# Patient Record
Sex: FEMALE | Race: BLACK OR AFRICAN AMERICAN | Employment: UNEMPLOYED | URBAN - METROPOLITAN AREA
[De-identification: names, ages, dates, MRNs, and addresses within clinical notes are randomized per-mention and may not be internally consistent; named-entity substitution may affect disease eponyms.]

---

## 2021-09-07 ENCOUNTER — HOSPITAL ENCOUNTER (EMERGENCY)
Age: 66
Discharge: PSYCHIATRIC OR SUBSTANCE USE FACILITY | End: 2021-09-08
Attending: EMERGENCY MEDICINE
Payer: MEDICARE

## 2021-09-07 DIAGNOSIS — R45.851 SUICIDAL IDEATION: Primary | ICD-10-CM

## 2021-09-07 LAB
ALBUMIN SERPL-MCNC: 3.3 G/DL (ref 3.4–5)
ALBUMIN/GLOB SERPL: 0.9 {RATIO} (ref 0.8–1.7)
ALP SERPL-CCNC: 56 U/L (ref 45–117)
ALT SERPL-CCNC: 24 U/L (ref 13–56)
AMPHET UR QL SCN: NEGATIVE
ANION GAP SERPL CALC-SCNC: 7 MMOL/L (ref 3–18)
AST SERPL-CCNC: 7 U/L (ref 10–38)
BARBITURATES UR QL SCN: NEGATIVE
BASOPHILS # BLD: 0 K/UL (ref 0–0.1)
BASOPHILS NFR BLD: 0 % (ref 0–2)
BENZODIAZ UR QL: NEGATIVE
BILIRUB SERPL-MCNC: 0.2 MG/DL (ref 0.2–1)
BUN SERPL-MCNC: 22 MG/DL (ref 7–18)
BUN/CREAT SERPL: 20 (ref 12–20)
CALCIUM SERPL-MCNC: 9.5 MG/DL (ref 8.5–10.1)
CANNABINOIDS UR QL SCN: NEGATIVE
CHLORIDE SERPL-SCNC: 109 MMOL/L (ref 100–111)
CO2 SERPL-SCNC: 27 MMOL/L (ref 21–32)
COCAINE UR QL SCN: NEGATIVE
COVID-19 RAPID TEST, COVR: NOT DETECTED
CREAT SERPL-MCNC: 1.11 MG/DL (ref 0.6–1.3)
DIFFERENTIAL METHOD BLD: ABNORMAL
EOSINOPHIL # BLD: 0.1 K/UL (ref 0–0.4)
EOSINOPHIL NFR BLD: 1 % (ref 0–5)
ERYTHROCYTE [DISTWIDTH] IN BLOOD BY AUTOMATED COUNT: 14.9 % (ref 11.6–14.5)
ETHANOL SERPL-MCNC: <3 MG/DL (ref 0–3)
GLOBULIN SER CALC-MCNC: 3.7 G/DL (ref 2–4)
GLUCOSE SERPL-MCNC: 90 MG/DL (ref 74–99)
HCT VFR BLD AUTO: 37.2 % (ref 35–45)
HDSCOM,HDSCOM: NORMAL
HGB BLD-MCNC: 12.4 G/DL (ref 12–16)
LYMPHOCYTES # BLD: 1.8 K/UL (ref 0.9–3.6)
LYMPHOCYTES NFR BLD: 26 % (ref 21–52)
MCH RBC QN AUTO: 28.5 PG (ref 24–34)
MCHC RBC AUTO-ENTMCNC: 33.3 G/DL (ref 31–37)
MCV RBC AUTO: 85.5 FL (ref 78–100)
METHADONE UR QL: NEGATIVE
MONOCYTES # BLD: 0.6 K/UL (ref 0.05–1.2)
MONOCYTES NFR BLD: 8 % (ref 3–10)
NEUTS SEG # BLD: 4.4 K/UL (ref 1.8–8)
NEUTS SEG NFR BLD: 64 % (ref 40–73)
OPIATES UR QL: NEGATIVE
PCP UR QL: NEGATIVE
PLATELET # BLD AUTO: 216 K/UL (ref 135–420)
PMV BLD AUTO: 11 FL (ref 9.2–11.8)
POTASSIUM SERPL-SCNC: 3.8 MMOL/L (ref 3.5–5.5)
PROT SERPL-MCNC: 7 G/DL (ref 6.4–8.2)
RBC # BLD AUTO: 4.35 M/UL (ref 4.2–5.3)
SODIUM SERPL-SCNC: 143 MMOL/L (ref 136–145)
SOURCE, COVRS: NORMAL
WBC # BLD AUTO: 6.8 K/UL (ref 4.6–13.2)

## 2021-09-07 PROCEDURE — 85025 COMPLETE CBC W/AUTO DIFF WBC: CPT

## 2021-09-07 PROCEDURE — 82077 ASSAY SPEC XCP UR&BREATH IA: CPT

## 2021-09-07 PROCEDURE — 87635 SARS-COV-2 COVID-19 AMP PRB: CPT

## 2021-09-07 PROCEDURE — 80307 DRUG TEST PRSMV CHEM ANLYZR: CPT

## 2021-09-07 PROCEDURE — 80053 COMPREHEN METABOLIC PANEL: CPT

## 2021-09-07 NOTE — ED PROVIDER NOTES
EMERGENCY DEPARTMENT HISTORY AND PHYSICAL EXAM    3:08 PM      Date: 9/7/2021  Patient Name: Gael James    History of Presenting Illness     Chief Complaint   Patient presents with    Suicidal       History Provided By: Patient    Additional History (Context): Gael James is a 61 y.o. female with PMHx significant for bioolar d/o, depression, breast cancer, lymphedema who presents with suicidal ideations with a plan to overdose of sleeping pills. Symptoms started today after she was in court as she felt this stressed her out. Denies HI or AV hallucinations/delusions. Denies illicit drug use, excessive alcohol intake, tobacco abuse. PCP: Chiquita, MD Marilu      Past History     Past Medical History:  No past medical history on file. Past Surgical History:  No past surgical history on file. Family History:  No family history on file. Social History:  Social History     Tobacco Use    Smoking status: Not on file   Substance Use Topics    Alcohol use: Not on file    Drug use: Not on file       Allergies: Allergies   Allergen Reactions    Motrin [Ibuprofen] Rash    Penicillins Rash         Review of Systems       Review of Systems   Constitutional: Negative. Negative for chills and fever. HENT: Negative. Negative for congestion, ear pain and rhinorrhea. Eyes: Negative. Negative for pain and redness. Respiratory: Negative. Negative for cough, shortness of breath, wheezing and stridor. Cardiovascular: Negative. Negative for chest pain and leg swelling. Gastrointestinal: Negative. Negative for abdominal pain, constipation, diarrhea, nausea and vomiting. Genitourinary: Negative. Negative for dysuria and frequency. Musculoskeletal: Negative. Negative for back pain and neck pain. Skin: Negative. Negative for rash and wound. Neurological: Negative. Negative for dizziness, seizures, syncope and headaches. Psychiatric/Behavioral: Positive for suicidal ideas.  The patient is nervous/anxious. All other systems reviewed and are negative. Physical Exam     Visit Vitals  /75   Pulse 87   Temp 98.3 °F (36.8 °C)   Resp 16   Ht 5' 10\" (1.778 m)   Wt 69.4 kg (153 lb)   SpO2 99%   BMI 21.95 kg/m²         Physical Exam  Vitals and nursing note reviewed. Constitutional:       General: She is not in acute distress. Appearance: Normal appearance. She is not ill-appearing, toxic-appearing or diaphoretic. HENT:      Head: Normocephalic and atraumatic. Nose: Nose normal.   Eyes:      General: Lids are normal. Vision grossly intact. Conjunctiva/sclera: Conjunctivae normal.   Cardiovascular:      Rate and Rhythm: Normal rate and regular rhythm. Pulses: Normal pulses. Heart sounds: Normal heart sounds. Pulmonary:      Effort: Pulmonary effort is normal.      Breath sounds: Normal breath sounds. Musculoskeletal:         General: Normal range of motion. Cervical back: Full passive range of motion without pain, normal range of motion and neck supple. Skin:     General: Skin is warm and dry. Capillary Refill: Capillary refill takes less than 2 seconds. Neurological:      General: No focal deficit present. Mental Status: She is alert and oriented to person, place, and time. Psychiatric:         Mood and Affect: Mood normal.         Behavior: Behavior normal. Behavior is cooperative. Thought Content: Thought content includes suicidal ideation. Thought content includes suicidal plan.            Diagnostic Study Results     Labs -  Recent Results (from the past 12 hour(s))   CBC WITH AUTOMATED DIFF    Collection Time: 09/07/21  3:09 PM   Result Value Ref Range    WBC 6.8 4.6 - 13.2 K/uL    RBC 4.35 4.20 - 5.30 M/uL    HGB 12.4 12.0 - 16.0 g/dL    HCT 37.2 35.0 - 45.0 %    MCV 85.5 78.0 - 100.0 FL    MCH 28.5 24.0 - 34.0 PG    MCHC 33.3 31.0 - 37.0 g/dL    RDW 14.9 (H) 11.6 - 14.5 %    PLATELET 823 863 - 977 K/uL    MPV 11.0 9.2 - 11.8 FL NEUTROPHILS 64 40 - 73 %    LYMPHOCYTES 26 21 - 52 %    MONOCYTES 8 3 - 10 %    EOSINOPHILS 1 0 - 5 %    BASOPHILS 0 0 - 2 %    ABS. NEUTROPHILS 4.4 1.8 - 8.0 K/UL    ABS. LYMPHOCYTES 1.8 0.9 - 3.6 K/UL    ABS. MONOCYTES 0.6 0.05 - 1.2 K/UL    ABS. EOSINOPHILS 0.1 0.0 - 0.4 K/UL    ABS. BASOPHILS 0.0 0.0 - 0.1 K/UL    DF AUTOMATED     METABOLIC PANEL, COMPREHENSIVE    Collection Time: 09/07/21  3:09 PM   Result Value Ref Range    Sodium 143 136 - 145 mmol/L    Potassium 3.8 3.5 - 5.5 mmol/L    Chloride 109 100 - 111 mmol/L    CO2 27 21 - 32 mmol/L    Anion gap 7 3.0 - 18 mmol/L    Glucose 90 74 - 99 mg/dL    BUN 22 (H) 7.0 - 18 MG/DL    Creatinine 1.11 0.6 - 1.3 MG/DL    BUN/Creatinine ratio 20 12 - 20      GFR est AA 60 (L) >60 ml/min/1.73m2    GFR est non-AA 49 (L) >60 ml/min/1.73m2    Calcium 9.5 8.5 - 10.1 MG/DL    Bilirubin, total 0.2 0.2 - 1.0 MG/DL    ALT (SGPT) 24 13 - 56 U/L    AST (SGOT) 7 (L) 10 - 38 U/L    Alk. phosphatase 56 45 - 117 U/L    Protein, total 7.0 6.4 - 8.2 g/dL    Albumin 3.3 (L) 3.4 - 5.0 g/dL    Globulin 3.7 2.0 - 4.0 g/dL    A-G Ratio 0.9 0.8 - 1.7     COVID-19 RAPID TEST    Collection Time: 09/07/21  3:09 PM   Result Value Ref Range    Specimen source Nasopharyngeal      COVID-19 rapid test Not detected NOTD     ETHYL ALCOHOL    Collection Time: 09/07/21  3:09 PM   Result Value Ref Range    ALCOHOL(ETHYL),SERUM <3 0 - 3 MG/DL   DRUG SCREEN, URINE    Collection Time: 09/07/21  3:09 PM   Result Value Ref Range    BENZODIAZEPINES Negative NEG      BARBITURATES Negative NEG      THC (TH-CANNABINOL) Negative NEG      OPIATES Negative NEG      PCP(PHENCYCLIDINE) Negative NEG      COCAINE Negative NEG      AMPHETAMINES Negative NEG      METHADONE Negative NEG      HDSCOM (NOTE)        Radiologic Studies -   No orders to display         Medical Decision Making   I am the first provider for this patient.     I reviewed available nursing notes, past medical history, past surgical history, family history and social history. Vital Signs-Reviewed the patient's vital signs. Records Reviewed: Nursing Notes and Old Medical Records (Time of Review: 3:08 PM)    Pulse Oximetry Analysis - 99% on RA- normal       ED Course: Progress Notes, Reevaluation, and Consults:  3:08 PM  Initial assessment performed. The patients presenting problems have been discussed, and they/their family are in agreement with the care plan formulated and outlined with them. I have encouraged them to ask questions as they arise throughout their visit. 4:28 PM Tamara Clark crisis RN aware of medical clearance. Pending evaluation. 9:01 PM : Pt care transferred to [facility]. History of patient complaint(s), available diagnostic reports and current treatment plan has been discussed thoroughly. Bedside rounding on patient occured : no . Intended disposition of patient : Psychiatric admission  Pending diagnostics reports and/or labs (please list): Pending bed search at this time    Provider Notes (Medical Decision Making):     24-year-old female presents to the ER with complaints of suicidal ideation with a suicidal plan in place. She denies auditory visual hallucinations/delusions or homicidal ideations. Will obtain appropriate studies to evaluate patient's complaints and treat symptomatically. Will disposition after reassessment assuming no clinical change or worsening and appropriate response to symptomatic treatment. Diagnosis     Clinical Impression:   1. Suicidal ideation        Disposition: pending      Follow-up Information    None          There are no discharge medications for this patient. Dictation disclaimer:  Please note that this dictation was completed with Gr[employer]per, the computer voice recognition software. Quite often unanticipated grammatical, syntax, homophones, and other interpretive errors are inadvertently transcribed by the computer software.   Please disregard these errors. Please excuse any errors that have escaped final proofreading.

## 2021-09-07 NOTE — BSMART NOTE
Patient seen via tele crisis at the request of Libertad Roca NP. Patient presented to the emergency room endorsing suicidal ideations with plan to take sleeping pills. Patient reports she is suicidal due to constant pain. She reports she has breast cancer that is currently in remission. Patient reports she purchased pills this am at [facility]. Patient denied homicidal ideations. Patient denied AV hallucinations. Patient denied substance abuse. Patient reports she has medical hx of  lower extremity insufficiency bilateral, lymphedema, neuropathy enlarge thyroid and pituitary gland. Patient reports she has psychiatric hx of Bipolar Disorder  Patient is currently prescribed Arimidex, aspirin, Percocet prn, melatonin 10 mg every am and 20 mg at night. Patient reports she is allergic to Penicillins and Motrin. Patient is not receiving outpatient therapy. Patient reports she was discharged from [facility] 9/6/21. Patient reports she had a pending court case today. Patient reports she has access to a gun and knives. Patient reports she doesn't have family here. Patient reports she moved from [facility] seven months ago. Patient reports she feels at home when inpatient. Patient reports she attended college for one year. Mental Status Exam    The patients appearance shows no evidence of impairment. The patients behavior tearful. The patient is oriented to person, place, time and situation. The patients speech normal. The patient mood is depressed. The patients range of affect is blunted. The patients thought content shows no evidence of impairment. The patients thought process shows no evidence of impairment. The patients memory shows no evidence of impairment. The patients appetite shows no evidence of impairment. The patients sleep I cant sleep. Its too much noise. The patients insight poor. The patients judgement poor.      Disposition: Patient is receptive to voluntary admission to [facility] or [facility]; discussed with Keyanna Bravo NP. Bed search initiated, spoke with Dee with [facility], no beds available.

## 2021-09-08 VITALS
DIASTOLIC BLOOD PRESSURE: 67 MMHG | HEART RATE: 70 BPM | WEIGHT: 153 LBS | BODY MASS INDEX: 21.9 KG/M2 | SYSTOLIC BLOOD PRESSURE: 112 MMHG | HEIGHT: 70 IN | OXYGEN SATURATION: 100 % | TEMPERATURE: 97.4 F | RESPIRATION RATE: 18 BRPM

## 2021-09-08 PROCEDURE — 99285 EMERGENCY DEPT VISIT HI MDM: CPT

## 2021-09-08 PROCEDURE — 99284 EMERGENCY DEPT VISIT MOD MDM: CPT

## 2021-09-08 PROCEDURE — 74011250636 HC RX REV CODE- 250/636: Performed by: EMERGENCY MEDICINE

## 2021-09-08 PROCEDURE — 74011250637 HC RX REV CODE- 250/637: Performed by: EMERGENCY MEDICINE

## 2021-09-08 PROCEDURE — 96372 THER/PROPH/DIAG INJ SC/IM: CPT

## 2021-09-08 PROCEDURE — 90791 PSYCH DIAGNOSTIC EVALUATION: CPT

## 2021-09-08 PROCEDURE — 74011250637 HC RX REV CODE- 250/637: Performed by: PHYSICIAN ASSISTANT

## 2021-09-08 RX ORDER — LORAZEPAM 1 MG/1
1 TABLET ORAL
Status: COMPLETED | OUTPATIENT
Start: 2021-09-08 | End: 2021-09-08

## 2021-09-08 RX ORDER — ACETAMINOPHEN 500 MG
1000 TABLET ORAL
Status: DISCONTINUED | OUTPATIENT
Start: 2021-09-08 | End: 2021-09-08 | Stop reason: HOSPADM

## 2021-09-08 RX ORDER — HYDROXYZINE 50 MG/ML
12.5 INJECTION, SOLUTION INTRAMUSCULAR
Status: DISCONTINUED | OUTPATIENT
Start: 2021-09-08 | End: 2021-09-08 | Stop reason: HOSPADM

## 2021-09-08 RX ADMIN — LORAZEPAM 1 MG: 1 TABLET ORAL at 11:53

## 2021-09-08 RX ADMIN — LORAZEPAM 1 MG: 1 TABLET ORAL at 20:32

## 2021-09-08 RX ADMIN — HYDROXYZINE HYDROCHLORIDE 12.5 MG: 50 INJECTION, SOLUTION INTRAMUSCULAR at 09:15

## 2021-09-08 NOTE — ED NOTES
Introduced myself to pt. Pt stating that she does not like it here and would like to be transferred to another facility. Pt requesting to be transferred to [facility]. Pt initially was very angry and frustrated then became tearful when I asked what was going on in her life. Pt still has SI. Provided pt with pillow and blanket.

## 2021-09-08 NOTE — ED NOTES
9:00 PM Assumed care of the pt at this time. Discussed with PEYMAN Busby concerning patient Danika Bryant, standard discussion of reason for visit, HPI, ROS, PE, and current results available. Recommendation for continuing to monitor the pt while in the ED awaiting placement. Loida Lara PA-C     2:00 AM Pt is turned over to Dr. Tre Kim, night ED Attending who agrees to assume care of this pt at this time. Discussed this case with the doctor who agrees with the plan to continue to monitor the pt while in the ED Awaiting placement. Loida Lara PA-C     Disposition: TBD     Dictation disclaimer:  Please note that this dictation was completed with Vyyo, the computer voice recognition software. Quite often unanticipated grammatical, syntax, homophones, and other interpretive errors are inadvertently transcribed by the computer software. Please disregard these errors. Please excuse any errors that have escaped final proofreading.

## 2021-09-08 NOTE — ED NOTES
Pt states she would like something for pain. Order for tylenol obtained. Pt states she is allergic to tylenol and cannot take it. Pt provided with heat packs. Pt states she is no longer suicidal and wants to go home.  Crisis team notified

## 2021-09-08 NOTE — ED NOTES
Verbal shift change report given to Logan Crow (oncoming nurse) by Dorothy Del Rio (offgoing nurse).  Report included the following information SBAR, ED Summary and MAR

## 2021-09-08 NOTE — ED NOTES
TRANSFER - OUT REPORT:    Verbal report given to Anita Terrell (name) on Carmine Sanders  being transferred to [facility](unit) for routine progression of care       Report consisted of patients Situation, Background, Assessment and   Recommendations(SBAR). Information from the following report(s) SBAR, ED Summary and MAR was reviewed with the receiving nurse. Lines:       Opportunity for questions and clarification was provided.       Patient awaiting transport

## 2021-09-08 NOTE — ED NOTES
3:03 PM patient was cooperative here in the ER, accepted to [facility] psychiatric Dr. Abram velasquez, no acute events here.

## 2021-09-08 NOTE — BSMART NOTE
Patient seen this afternoon and reports that she would like to go to [facility] which is her first preference and then [facility]. Called and spoke to Mili Bryant at [facility] and she took her information down to review also faxed over paperwork. Paperwork faxed to [facility], and received call from Shayna Alberto at [facility] to see if a bed was still needed if they are able to accommodate. She was declined at [facility] [place] by Dr. Clemencia Adam, and patient declined to come to our facility because we cant give her pain medicine. 1425: Patient was admitted to [facility] under the care of Dr. Roldan Reyes on the unit TCU, called Tere in ED to provide her with number to call report [phone].

## 2021-09-09 NOTE — ED NOTES
8:14 PM patient was requesting an update regarding when she will be transferred out to [facility]. I have spoken with the on-call crisis worker who states that the patient has already been accepted at [facility] and has a bed ready. After reviewing the various nursing notes placed in the patient's chart today it was documented that the patient should have a medical transport ride available at 8:30 PM this evening. Patient is requesting another dose of Ativan before she leaves the emergency department. Her last dose was at 11 AM this morning. Second dose of Ativan was ordered for the patient.  Loida Lara PA-C